# Patient Record
Sex: MALE | Race: WHITE | Employment: FULL TIME | URBAN - METROPOLITAN AREA
[De-identification: names, ages, dates, MRNs, and addresses within clinical notes are randomized per-mention and may not be internally consistent; named-entity substitution may affect disease eponyms.]

---

## 2021-08-01 ENCOUNTER — HOSPITAL ENCOUNTER (EMERGENCY)
Age: 26
Discharge: HOME | End: 2021-08-01
Payer: COMMERCIAL

## 2021-08-01 ENCOUNTER — APPOINTMENT (OUTPATIENT)
Dept: GENERAL RADIOLOGY | Age: 26
End: 2021-08-01
Payer: COMMERCIAL

## 2021-08-01 VITALS
RESPIRATION RATE: 16 BRPM | HEIGHT: 65 IN | OXYGEN SATURATION: 97 % | DIASTOLIC BLOOD PRESSURE: 84 MMHG | HEART RATE: 69 BPM | WEIGHT: 160 LBS | BODY MASS INDEX: 26.66 KG/M2 | SYSTOLIC BLOOD PRESSURE: 131 MMHG | TEMPERATURE: 98.5 F

## 2021-08-01 DIAGNOSIS — S52.572A OTHER CLOSED INTRA-ARTICULAR FRACTURE OF DISTAL END OF LEFT RADIUS, INITIAL ENCOUNTER: Primary | ICD-10-CM

## 2021-08-01 PROCEDURE — 6370000000 HC RX 637 (ALT 250 FOR IP): Performed by: NURSE PRACTITIONER

## 2021-08-01 PROCEDURE — 73090 X-RAY EXAM OF FOREARM: CPT

## 2021-08-01 PROCEDURE — 29125 APPL SHORT ARM SPLINT STATIC: CPT

## 2021-08-01 PROCEDURE — 99284 EMERGENCY DEPT VISIT MOD MDM: CPT

## 2021-08-01 PROCEDURE — 73110 X-RAY EXAM OF WRIST: CPT

## 2021-08-01 RX ORDER — NAPROXEN 500 MG/1
500 TABLET ORAL 2 TIMES DAILY PRN
Qty: 14 TABLET | Refills: 0 | Status: SHIPPED | OUTPATIENT
Start: 2021-08-01 | End: 2021-08-08

## 2021-08-01 RX ORDER — IBUPROFEN 800 MG/1
800 TABLET ORAL ONCE
Status: COMPLETED | OUTPATIENT
Start: 2021-08-01 | End: 2021-08-01

## 2021-08-01 RX ORDER — HYDROCODONE BITARTRATE AND ACETAMINOPHEN 5; 325 MG/1; MG/1
1 TABLET ORAL ONCE
Status: COMPLETED | OUTPATIENT
Start: 2021-08-01 | End: 2021-08-01

## 2021-08-01 RX ORDER — HYDROCODONE BITARTRATE AND ACETAMINOPHEN 5; 325 MG/1; MG/1
1 TABLET ORAL EVERY 6 HOURS PRN
Qty: 12 TABLET | Refills: 0 | Status: SHIPPED | OUTPATIENT
Start: 2021-08-01 | End: 2021-08-04

## 2021-08-01 RX ADMIN — HYDROCODONE BITARTRATE AND ACETAMINOPHEN 1 TABLET: 5; 325 TABLET ORAL at 14:24

## 2021-08-01 RX ADMIN — IBUPROFEN 800 MG: 800 TABLET, FILM COATED ORAL at 14:23

## 2021-08-01 ASSESSMENT — PAIN DESCRIPTION - ORIENTATION: ORIENTATION: LEFT

## 2021-08-01 ASSESSMENT — PAIN SCALES - GENERAL
PAINLEVEL_OUTOF10: 8
PAINLEVEL_OUTOF10: 8

## 2021-08-01 ASSESSMENT — PAIN DESCRIPTION - ONSET: ONSET: SUDDEN

## 2021-08-01 ASSESSMENT — PAIN DESCRIPTION - FREQUENCY: FREQUENCY: CONTINUOUS

## 2021-08-01 ASSESSMENT — PAIN - FUNCTIONAL ASSESSMENT: PAIN_FUNCTIONAL_ASSESSMENT: PREVENTS OR INTERFERES SOME ACTIVE ACTIVITIES AND ADLS

## 2021-08-01 ASSESSMENT — PAIN DESCRIPTION - PAIN TYPE: TYPE: ACUTE PAIN

## 2021-08-01 ASSESSMENT — PAIN DESCRIPTION - LOCATION: LOCATION: ANKLE

## 2021-08-01 ASSESSMENT — PAIN DESCRIPTION - DESCRIPTORS: DESCRIPTORS: THROBBING

## 2021-08-01 NOTE — ED PROVIDER NOTES
[facility]  Department of Emergency Medicine   ED  Encounter Note  Admit Date/RoomTime: 2021  1:22 PM  ED Room: /    NAME: Bridget Kennedy  : 1995  MRN: [id number]     Chief Complaint:  Arm Injury (left, injured left wrist/forearm diving for a baseball, deformity noted)    History of Present Illness       Bridget Kennedy is a 31 y.o. old male who presents to the emergency department by private vehicle with his sister, for traumatic Left radial wrist pain which occured 1:30 PM and states he went to dive for softball while playing recreational softball and has pain and he has not had no previous injury. Patient has no prior history of pain/injury with regards to today's visit\"}. He is right handed. The patients tetanus status is unknown and denies any abrasions or lacerations. Since onset the symptoms have been persistent and gradually worsening. His pain is aggraveated by any movement, any use of or pressure on or palpation of painful area and relieved by nothing, as no treatment has been provided prior to this visit. He denies any head injury, headache, loss of consciousness, confusion, dizziness, neck pain, chest pain, abdominal pain, back pain, numbness, weakness, blurred vision, nausea or vomiting. He denies any abrasions or lacerations. ROS   Pertinent positives and negatives are stated within HPI, all other systems reviewed and are negative. Past Medical History:  has no past medical history on file. Surgical History:  has no past surgical history on file. Social History:  reports that he has been smoking e-cigarettes. He has never used smokeless tobacco. He reports current alcohol use. He reports previous drug use. Drug: Marijuana. Family History: family history is not on file. Allergies: Patient has no known allergies.     Physical Exam   Oxygen Saturation Interpretation: Normal.        ED Triage Vitals [21 1351] BP Temp Temp Source Pulse Resp SpO2 Height Weight   131/84 98.5 °F (36.9 °C) Oral 69 16 97 % 5' 5\" (1.651 m) 160 lb (72.6 kg)         Constitutional:  Alert, development consistent with age. Neck:  Normal ROM. Supple. Non-tender. Left Wrist: radial aspect. Tenderness:  moderate to radial aspect of wrist..             Swelling: Mild. Deformity: no deformity observed/palpated. ROM: diminished range with pain. Skin:  no wounds, erythema, or swelling. Neurovascular: Motor deficit: limited due to pain. Sensory deficit:   none. Sensation intact to light touch in distal fingers. Pulse deficit: none. 2+ symmetrically strong radial pulses. Capillary refill: normal.  Then 3 seconds in distal fingers. Left Elbow:             Tenderness: none              Swelling: None. Deformity: no deformity observed/palpated. ROM: full range of motion. Skin:  no wounds, erythema, or swelling. Left Hand:              Tenderness: none              Swelling: None. Deformity: no deformity observed/palpated. ROM: full range of motion of all fingers. .              Skin:  no wounds, erythema, or swelling. Lymphatics: No lymphangitis or adenopathy noted. Neurological:  Oriented. Motor functions intact. Lab / Imaging Results   (All laboratory and radiology results have been personally reviewed by myself)  Labs:  No results found for this visit on 08/01/21. Imaging: All Radiology results interpreted by Radiologist unless otherwise noted. XR WRIST LEFT (MIN 3 VIEWS)   Final Result   Nondisplaced intra-articular fracture involving the distal radius. XR RADIUS ULNA LEFT (2 VIEWS)   Final Result   Nondisplaced intra-articular fracture involving the distal radius.            ED Course / Medical Decision Making     Medications   HYDROcodone-acetaminophen (NORCO) 5-325 MG per tablet 1 tablet (1 tablet Oral Given 8/1/21 1424)   ibuprofen (ADVIL;MOTRIN) tablet 800 mg (800 mg Oral Given 8/1/21 1423)        Consult:   none    Procedure(s):  PROCEDURE NOTE  8/1/21       Time: 7732  SPLINT  APPLICATION  Risks, benefits and alternatives (for applicable procedures below) described. Performed By:  Tory Zamarripa and supervised by ZAINAB Monsivais CNP. Indication:  fracture of distal radius. Procedure:   A sugar tong splint was applied by the LPN. The patient tolerated the procedure well. 1520 Neurovascular status remains intact post splint application with capillary refill less than 3 seconds in distal fingers and full sensation intact to light touch in distal fingers. MDM:      Imaging was obtained based on high suspicion for fracture / bony abnormality as per history/physical findings. X-ray reveals a intra-articular fracture involving the distal radius extending into the radial styloid no foreign bodies normal carpal bone alignment. Patient has no neurologic or sensory deficit on examination. Patient has no medial radial or ulnar nerve deficits on examination. Patient was placed in a sugar tong splint and has no neurologic or sensory deficit on examination post splint application. He was given a Norco in the ED with Motrin and will be placed in a sling and advised on pendulum swings not to get a frozen joint. He had no head injury or loss of consciousness. He is afebrile, nontoxic in appearance, hemodynamically stable with no shortness of breath or chest pain. Nexus criteria negative for imaging. Patient will be discharged with short course of NSAIDs and narcotics and advised not to drink alcohol, drive or operate heavy equipment while taking narcotic medications.   Patient also advised to follow-up with orthopedic on-call for reevaluation in the next 1 to 2 days and states that he recently moved out of town and will call his PCP for referral..  Advised on signs and symptoms warranting immediate return to the ED for reevaluation at any time. Case was discussed with Dr. Carlos A Webb prior to disposition. Controlled Substance Monitoring:    Acute and Chronic Pain Monitoring:   RX Monitoring 8/1/2021   Periodic Controlled Substance Monitoring Possible medication side effects, risk of tolerance/dependence & alternative treatments discussed. ;No signs of potential drug abuse or diversion identified. Plan of Care/Counseling:  ZAINAB Coleman CNP reviewed today's visit with the patient in addition to providing specific details for the plan of care and counseling regarding the diagnosis and prognosis. Questions are answered at this time and are agreeable with the plan. Assessment      1. Other closed intra-articular fracture of distal end of left radius, initial encounter      Plan   Disposition:   Discharged home. Patient condition is good    New Medications     New Prescriptions    No medications on file     Electronically signed by ZAINAB Coleman CNP   DD: 8/1/21  **This report was transcribed using voice recognition software. Every effort was made to ensure accuracy; however, inadvertent computerized transcription errors may be present.   END OF ED PROVIDER NOTE     ZAINAB Coleman CNP  08/02/21 0032

## 2021-08-02 ENCOUNTER — TELEPHONE (OUTPATIENT)
Dept: ORTHOPEDIC SURGERY | Age: 26
End: 2021-08-02

## 2021-08-02 NOTE — PROGRESS NOTES
Spoke with patient he lives in [place] and was in [facility]. He has an appointment with an orthopaedist in [place] on 8/3/21.